# Patient Record
Sex: FEMALE | Race: WHITE | Employment: FULL TIME | URBAN - METROPOLITAN AREA
[De-identification: names, ages, dates, MRNs, and addresses within clinical notes are randomized per-mention and may not be internally consistent; named-entity substitution may affect disease eponyms.]

---

## 2018-09-26 ENCOUNTER — OFFICE VISIT (OUTPATIENT)
Dept: DERMATOLOGY | Age: 33
End: 2018-09-26
Payer: COMMERCIAL

## 2018-09-26 DIAGNOSIS — L70.9 ADULT ACNE: Primary | ICD-10-CM

## 2018-09-26 DIAGNOSIS — L72.0 EPIDERMOID CYST: ICD-10-CM

## 2018-09-26 PROCEDURE — 11900 INJECT SKIN LESIONS </W 7: CPT | Performed by: DERMATOLOGY

## 2018-09-26 PROCEDURE — 99212 OFFICE O/P EST SF 10 MIN: CPT | Performed by: DERMATOLOGY

## 2018-09-26 RX ORDER — CETIRIZINE HYDROCHLORIDE 10 MG/1
10 TABLET ORAL DAILY
COMMUNITY

## 2019-02-27 RX ORDER — SPIRONOLACTONE 50 MG/1
TABLET, FILM COATED ORAL
Qty: 30 TABLET | Refills: 3 | Status: SHIPPED | OUTPATIENT
Start: 2019-02-27 | End: 2019-07-11 | Stop reason: SDUPTHER

## 2019-03-07 ENCOUNTER — OFFICE VISIT (OUTPATIENT)
Dept: DERMATOLOGY | Age: 34
End: 2019-03-07
Payer: COMMERCIAL

## 2019-03-07 DIAGNOSIS — L70.9 ADULT ACNE: Primary | ICD-10-CM

## 2019-03-07 DIAGNOSIS — L72.0 EPIDERMOID CYST: ICD-10-CM

## 2019-03-07 PROCEDURE — 99213 OFFICE O/P EST LOW 20 MIN: CPT | Performed by: DERMATOLOGY

## 2019-05-07 ENCOUNTER — TELEPHONE (OUTPATIENT)
Dept: DERMATOLOGY | Age: 34
End: 2019-05-07

## 2019-05-07 NOTE — TELEPHONE ENCOUNTER
This is a patient of Dr. Noam Palacio. She was just seen on 3-19-19. She has an area on her nose which Dr. Noam Palacio has previously treated that has come back again and is inflamed. She would like an appointment for another injection. She can be reached at [phone].   Thanks

## 2019-05-08 ENCOUNTER — OFFICE VISIT (OUTPATIENT)
Dept: DERMATOLOGY | Age: 34
End: 2019-05-08
Payer: COMMERCIAL

## 2019-05-08 DIAGNOSIS — L72.3 INFLAMED EPIDERMOID CYST OF SKIN: Primary | ICD-10-CM

## 2019-05-08 PROCEDURE — 11900 INJECT SKIN LESIONS </W 7: CPT | Performed by: DERMATOLOGY

## 2019-05-08 NOTE — PROGRESS NOTES
[facility]  Treemerissa Autumn Rosas  1985    35 y.o. female     Date of Visit: 5/8/2019    Chief Complaint: cyst flared. History of Present Illness:    F/u for a cyst on the left nasal tip - flared up in the last few days with stress. Improved in past with intralesional Kenalog. Imaging in [place] showed a 0.2 cm hypoechoic lesion without any calcification. Review of Systems:  None. Past Medical History, Family History, Surgical History, Medications and Allergies reviewed. Past Medical History:   Diagnosis Date    Acne     Chicken pox      No past surgical history on file. Allergies   Allergen Reactions    Ciprofloxacin      Lip swelling     Outpatient Medications Marked as Taking for the 5/8/19 encounter (Office Visit) with Dmitri Mcgraw MD   Medication Sig Dispense Refill    spironolactone (ALDACTONE) 50 MG tablet TAKE 1 TABLET BY MOUTH ONCE DAILY 30 tablet 3    Drospirenone-Ethinyl Estradiol (JEREMY 28 PO) Take by mouth      cetirizine (ZYRTEC) 10 MG tablet Take 10 mg by mouth daily      diphenhydrAMINE (BENADRYL) 25 MG capsule Take 25 mg by mouth every 6 hours as needed for Itching.  Cholecalciferol (VITAMIN D3) 2000 UNITS CAPS Take 1 capsule by mouth daily. 30 capsule 11         Physical Examination       Well appearing. 1.  Left nasal tip - erythematous papule. Assessment and Plan     1. Inflamed epidermoid cyst of skin     Intralesional Kenalog 2.5 mg/mL - 0.05 mL injected into the cyst on the right left nasal tip.

## 2019-05-28 ENCOUNTER — PATIENT MESSAGE (OUTPATIENT)
Dept: DERMATOLOGY | Age: 34
End: 2019-05-28

## 2019-05-30 ENCOUNTER — OFFICE VISIT (OUTPATIENT)
Dept: DERMATOLOGY | Age: 34
End: 2019-05-30
Payer: COMMERCIAL

## 2019-05-30 DIAGNOSIS — L72.0 EPIDERMOID CYST: Primary | ICD-10-CM

## 2019-05-30 PROCEDURE — 11900 INJECT SKIN LESIONS </W 7: CPT | Performed by: DERMATOLOGY

## 2019-05-30 NOTE — PROGRESS NOTES
[facility]  Autumn Foy  1985    35 y.o. female     Date of Visit: 5/30/2019    Chief Complaint: cyst     History of Present Illness:    F/u for a cyst on the nasal tip - better but not gone with intralesional Kenalog 3 weeks ago. Review of Systems:  None. Past Medical History, Family History, Surgical History, Medications and Allergies reviewed. Past Medical History:   Diagnosis Date    Acne     Chicken pox      History reviewed. No pertinent surgical history. Allergies   Allergen Reactions    Ciprofloxacin      Lip swelling     Outpatient Medications Marked as Taking for the 5/30/19 encounter (Office Visit) with Allen Adams MD   Medication Sig Dispense Refill    spironolactone (ALDACTONE) 50 MG tablet TAKE 1 TABLET BY MOUTH ONCE DAILY 30 tablet 3    Drospirenone-Ethinyl Estradiol (JEREMY 28 PO) Take by mouth      cetirizine (ZYRTEC) 10 MG tablet Take 10 mg by mouth daily      diphenhydrAMINE (BENADRYL) 25 MG capsule Take 25 mg by mouth every 6 hours as needed for Itching.  Cholecalciferol (VITAMIN D3) 2000 UNITS CAPS Take 1 capsule by mouth daily. 30 capsule 11         Physical Examination       Well appearing. 1.  Left nasal tip - small dermal whitish papule. Assessment and Plan     1.  Epidermoid cyst of the nose -     Intralesional Kenalog 2.5 mg/mL - less than 0.1 mL injected into the cyst.

## 2020-03-09 ENCOUNTER — OFFICE VISIT (OUTPATIENT)
Dept: DERMATOLOGY | Age: 35
End: 2020-03-09
Payer: COMMERCIAL

## 2020-03-09 PROCEDURE — 99213 OFFICE O/P EST LOW 20 MIN: CPT | Performed by: DERMATOLOGY

## 2020-03-09 RX ORDER — DOXYCYCLINE HYCLATE 100 MG
TABLET ORAL
COMMUNITY
Start: 2020-03-07 | End: 2021-09-14

## 2020-07-11 ENCOUNTER — PATIENT MESSAGE (OUTPATIENT)
Dept: DERMATOLOGY | Age: 35
End: 2020-07-11

## 2020-07-13 NOTE — TELEPHONE ENCOUNTER
From: Angel Higgins  To: Bubba Pendleton MD  Sent: 7/11/2020 3:57 PM EDT  Subject: Non-Urgent Medical Question    Hi Dr. Mabel Curtis (and staff),    Right before the Covid-19 lock-down I came in for a severe allergic reaction on my upper lip. The scabbing and swelling went away with the topical ointment and steroids prescribed, but I've got redness still. The redness in the same place as the reaction, it's occasional - sometimes it will be there, sometimes it won't, and sometimes it the entire affected area, other times it's just part. It's been months now, what should I do? Do I need to come in/have an e-visit? Let me know, thanks.     Donnell Jimenez

## 2020-08-04 NOTE — TELEPHONE ENCOUNTER
Offered patient appt for tomorrow @ 11am but patient declined. Will call her back with any cancellations.

## 2020-08-11 NOTE — TELEPHONE ENCOUNTER
Called and left message for patient to call back office. Please offer her an appt on 8/18/20 if still available.

## 2020-08-12 ENCOUNTER — OFFICE VISIT (OUTPATIENT)
Dept: DERMATOLOGY | Age: 35
End: 2020-08-12
Payer: COMMERCIAL

## 2020-08-12 VITALS — TEMPERATURE: 97.9 F

## 2020-08-12 PROCEDURE — 99212 OFFICE O/P EST SF 10 MIN: CPT | Performed by: DERMATOLOGY

## 2020-08-12 NOTE — PROGRESS NOTES
[facility]  Scotty Giron MD  [phone]      Bisi Deleon  1985    28 y.o. female     Date of Visit: 8/12/2020    Chief Complaint: redness    History of Present Illness:    1. She has a history of allergic contact dermatitis of the upper lip due to mupirocin nearly 6 months ago. Her dermatitis cleared but she continues to suffer from prominent erythema on the upper lip that waxes and wanes in severity. Review of Systems:  None. Past Medical History, Family History, Surgical History, Medications and Allergies reviewed. Past Medical History:   Diagnosis Date    Acne     Chicken pox      History reviewed. No pertinent surgical history. Allergies   Allergen Reactions    Ciprofloxacin      Lip swelling    Seasonal Other (See Comments)     sneezing     Outpatient Medications Marked as Taking for the 8/12/20 encounter (Office Visit) with Ana Gandhi MD   Medication Sig Dispense Refill    spironolactone (ALDACTONE) 50 MG tablet Take 1 tablet by mouth daily 30 tablet 5    doxycycline hyclate (VIBRA-TABS) 100 MG tablet       triamcinolone (KENALOG) 0.1 % ointment Apply to affected area twice daily for up to 2 weeks or until improved. 30 g 0    Drospirenone-Ethinyl Estradiol (JEREMY 28 PO) Take by mouth      cetirizine (ZYRTEC) 10 MG tablet Take 10 mg by mouth daily      diphenhydrAMINE (BENADRYL) 25 MG capsule Take 25 mg by mouth every 6 hours as needed for Itching.  Cholecalciferol (VITAMIN D3) 2000 UNITS CAPS Take 1 capsule by mouth daily. 30 capsule 11       Physical Examination     Well appearing. 1.  Upper lip with mild to moderate erythema. Blanches with pressure. Assessment and Plan     1. Postinflammatory erythema    Vbeam with Dr. Galen Victor.

## 2020-08-12 NOTE — Clinical Note
Antonina Rojas has post inflammatory erythema of the upper lip that has been present since she suffered from allergic contact dermatitis 6 mos ago.   Is this someone you could treat with Vbeam?      Thanks,    Hubert Alonzo

## 2020-08-14 NOTE — PROGRESS NOTES
It's a bit hard to predict if it'll respond and clear completely but it's definitely worth trying. Send it back to Yovany Espinoza to schedule for Vbeam if she wants to see me.

## 2020-08-31 ENCOUNTER — PROCEDURE VISIT (OUTPATIENT)
Dept: DERMATOLOGY | Age: 35
End: 2020-08-31

## 2020-08-31 VITALS — TEMPERATURE: 97.7 F

## 2020-08-31 PROCEDURE — MISCHAIR23 LASER TEST SPOT: Performed by: DERMATOLOGY

## 2020-08-31 PROCEDURE — DM01300 VBEAM LASER EXTRA SMALL AREA: Performed by: DERMATOLOGY

## 2020-08-31 NOTE — PATIENT INSTRUCTIONS
Following the procedure, the treated areas may be red or appear bruised and will likely be swollen for a few hours or up to a few days. The affected areas should be treated delicately following treatment. Discomfort and swelling should be treated with the application of hourly cool compresses the evening of the procedure. Avoid applying ice directly to the skin. If your face was treated, you may want to sleep with your head elevated on an extra pillow to help minimize swelling. Wear sunscreen daily and avoid strenuous activity following rosacea treatments to optimize results. Avoid extra aspirin, ibuprofen, vitamin E following the procedure - this may increase your chance of bruising. Improper care of the treated area while the discoloration is present may increase the chance of scarring or skin textural changes to the treated area. Please call the office if you have excessive discomfort, herpes simplex virus flare, or burns, blisters, or scabbing.

## 2020-08-31 NOTE — PROGRESS NOTES
Laser Procedure Note       Messi Arreola   YOB: 1985    DATE OF VISIT:  2020  Chief Complaint   Patient presents with    Laser Treatment   * - WEBN    LASER: Vbeam  DIAGNOSIS: erythema/telangiectasias    Here for treatment of erythema/telangiectasias on the upper lip/medial cheeks. Occurred after contact dermatitis from bactroban ~ 6 mos ago. No previous laser. We discussed treatment options, including no treatment as well as the following:  - need for multiple treatments and risk of incomplete clearance, recurrence  - risk of erythema, edema, purpura, dyspigmentation and scarring    PATIENT IDENTIFIED PER PROTOCOL: yes  LOCATION(S): face  VERIFIED AND MARKED: yes  TECHNIQUES, RISKS, BENEFITS AND ALTERNATIVES EXPLAINED: yes  CONSENT SIGNED, WITNESSED AND DATED: yes      OPERATIVE REPORT    DIAGNOSIS, LOCATION, PROCEDURE RECONFIRMED: yes   APPROPRIATE EYE PROTECTION for PATIENT: yes  APPROPRIATE EYE PROTECTION for PROVIDER and OTHERS PRESENT: yes  ANESTHESIA/PRE-OP MEDICATIONS: none  LASER SETTINGS:  (1)  WAVELENGTH: 595  LENS: 3x10  FLUENCE: 12.5  PULSE DURATION: 10  COOLIN/20  (2)  WAVELENGTH: 595  LENS: 10  FLUENCE: 7.5  PULSE DURATION: 10  COOLIN/20    PROCEDURE NOTE:  Individual telangiectasias treated with setting 1 - one - two pulses/  Then upper lip, medial cheeks and nose treated with setting 2. POST-OPERATIVE CARE/DISPOSITION: ice pack prn  COMPLICATIONS: none  MEDICATIONS: none  WOUND CARE INSTRUCTIONS PROVIDED: yes    F/u 1 month.         [address] [facility]

## 2020-09-14 ENCOUNTER — OFFICE VISIT (OUTPATIENT)
Dept: DERMATOLOGY | Age: 35
End: 2020-09-14
Payer: COMMERCIAL

## 2020-09-14 VITALS — TEMPERATURE: 97.2 F

## 2020-09-14 PROCEDURE — 99213 OFFICE O/P EST LOW 20 MIN: CPT | Performed by: DERMATOLOGY

## 2020-09-15 RX ORDER — SPIRONOLACTONE 50 MG/1
50 TABLET, FILM COATED ORAL DAILY
Qty: 30 TABLET | Refills: 5 | Status: SHIPPED | OUTPATIENT
Start: 2020-09-15 | End: 2021-03-16 | Stop reason: SDUPTHER

## 2020-10-09 ENCOUNTER — TELEPHONE (OUTPATIENT)
Dept: DERMATOLOGY | Age: 35
End: 2020-10-09

## 2020-11-18 ENCOUNTER — TELEPHONE (OUTPATIENT)
Dept: DERMATOLOGY | Age: 35
End: 2020-11-18

## 2020-11-18 NOTE — TELEPHONE ENCOUNTER
LVM for patient to return call.
Pt calling not feeling well need to r/s appt for v-beam with  pls return call back @ [phone] to discuss
Cardiac

## 2021-03-16 RX ORDER — SPIRONOLACTONE 50 MG/1
50 TABLET, FILM COATED ORAL DAILY
Qty: 30 TABLET | Refills: 5 | Status: SHIPPED | OUTPATIENT
Start: 2021-03-16 | End: 2021-09-07

## 2021-09-14 ENCOUNTER — TELEPHONE (OUTPATIENT)
Dept: DERMATOLOGY | Age: 36
End: 2021-09-14

## 2021-09-14 ENCOUNTER — OFFICE VISIT (OUTPATIENT)
Dept: DERMATOLOGY | Age: 36
End: 2021-09-14
Payer: COMMERCIAL

## 2021-09-14 VITALS — TEMPERATURE: 99 F

## 2021-09-14 DIAGNOSIS — L70.9 ADULT ACNE: Primary | ICD-10-CM

## 2021-09-14 DIAGNOSIS — L53.9 ERYTHEMA: ICD-10-CM

## 2021-09-14 DIAGNOSIS — L71.9 ROSACEA: ICD-10-CM

## 2021-09-14 PROCEDURE — 99213 OFFICE O/P EST LOW 20 MIN: CPT | Performed by: DERMATOLOGY

## 2021-09-14 RX ORDER — METRONIDAZOLE 7.5 MG/G
GEL TOPICAL
Qty: 45 G | Refills: 2 | Status: SHIPPED | OUTPATIENT
Start: 2021-09-14 | End: 2022-09-15

## 2021-09-14 NOTE — PROGRESS NOTES
[facility]  Lacey Soto MD  [phone]      Hussain Fu  1985    39 y.o. female     Date of Visit: 9/14/2021    Chief Complaint: acne, post inflammatory erythema    History of Present Illness:    1. She returns today to follow-up for adult female acne-reports great control with Caro and spironolactone 50 mg daily. She reports no adverse effects of treatment. 2.  She also complains of new onset erythematous papular eruption on the central face. 3.  She also has a history of postinflammatory erythema secondary to allergic contact dermatitis that she experienced in 2020. She had treatment with laser with initial improvement. Review of Systems:  Gen: Feels well, good sense of health. Past Medical History, Family History, Surgical History, Medications and Allergies reviewed. Past Medical History:   Diagnosis Date    Acne     Chicken pox      History reviewed. No pertinent surgical history. Allergies   Allergen Reactions    Ciprofloxacin      Lip swelling    Neosporin [Bacitracin-Polymyxin B]     Seasonal Other (See Comments)     sneezing    Bactroban [Mupirocin Calcium] Rash     Outpatient Medications Marked as Taking for the 9/14/21 encounter (Office Visit) with Jackee Gosselin, MD   Medication Sig Dispense Refill    Acetaminophen-Codeine (TYLENOL WITH CODEINE #3 PO) Take by mouth as needed (Migraines)      metroNIDAZOLE (METROGEL) 0.75 % gel Apply to the face 2 times daily for rosacea. 45 g 2    spironolactone (ALDACTONE) 50 MG tablet TAKE 1 TABLET BY MOUTH DAILY 30 tablet 3    Drospirenone-Ethinyl Estradiol (JEREMY 28 PO) Take by mouth      cetirizine (ZYRTEC) 10 MG tablet Take 10 mg by mouth daily      diphenhydrAMINE (BENADRYL) 25 MG capsule Take 25 mg by mouth every 6 hours as needed for Itching.  Cholecalciferol (VITAMIN D3) 2000 UNITS CAPS Take 1 capsule by mouth daily.  30 capsule 11         Physical Examination     Well appearing. 1.  Clear. 2.  Left medial cheek/nose, chin with several small erythematous papules and a background of erythema. 3.  Upper lip with mild erythema. Assessment and Plan     1. Adult acne - remains under great control    Continue Caro and spironolactone 50 mg daily. 2. Rosacea - mild papulopustular, new diagnosis     Start metronidazole 0.75% gel twice daily. 3. Erythema, postinflammatory-improved but not yet clear    Vbeam with Dr. Allan Reed. Return in about 1 year (around 9/14/2022).     --Dolly Deng MD

## 2021-10-08 ENCOUNTER — TELEPHONE (OUTPATIENT)
Dept: DERMATOLOGY | Age: 36
End: 2021-10-08

## 2021-10-08 NOTE — TELEPHONE ENCOUNTER
Dr Yina Thorpe patient  Pt c/b #[phone]  Pt stated  Needs to cancel/reschedule her Vbeam procedure appt that is scheduled on 10/12 due to a work conflict.

## 2021-11-11 ENCOUNTER — PROCEDURE VISIT (OUTPATIENT)
Dept: DERMATOLOGY | Age: 36
End: 2021-11-11

## 2021-11-11 VITALS — TEMPERATURE: 97.2 F

## 2021-11-11 DIAGNOSIS — L71.9 ROSACEA: Primary | ICD-10-CM

## 2021-11-11 DIAGNOSIS — L53.9 ERYTHEMA: ICD-10-CM

## 2021-11-11 PROCEDURE — DM01310 VBEAM LASER SMALL OR 2 AREAS: Performed by: DERMATOLOGY

## 2021-11-11 NOTE — PROGRESS NOTES
Laser Procedure Note       Prashant Brennan   YOB: 1985    DATE OF VISIT:  2021  No chief complaint on file. * - WEBN    LASER: Vbeam - last treated in 2020 x 1  DIAGNOSIS: erythema/telangiectasias    Here for treatment of erythema/telangiectasias on the upper lip/medial cheeks. Occurred after contact dermatitis from bactroban ~ 6 mos ago. 1 previous laser with some improvement. We discussed treatment options, including no treatment as well as the following:  - need for multiple treatments and risk of incomplete clearance, recurrence  - risk of erythema, edema, purpura, dyspigmentation and scarring    PATIENT IDENTIFIED PER PROTOCOL: yes  LOCATION(S): face  VERIFIED AND MARKED: yes  TECHNIQUES, RISKS, BENEFITS AND ALTERNATIVES EXPLAINED: yes  CONSENT SIGNED, WITNESSED AND DATED: yes      OPERATIVE REPORT    DIAGNOSIS, LOCATION, PROCEDURE RECONFIRMED: yes   APPROPRIATE EYE PROTECTION for PATIENT: yes  APPROPRIATE EYE PROTECTION for PROVIDER and OTHERS PRESENT: yes  ANESTHESIA/PRE-OP MEDICATIONS: none  LASER SETTINGS:  (1)  WAVELENGTH: 595  LENS: 3x10  FLUENCE: 12.5  PULSE DURATION: 10  COOLIN/20  (2)  WAVELENGTH: 595  LENS: 10  FLUENCE: 7.5  PULSE DURATION: 10  COOLIN/20    PROCEDURE NOTE:  Individual telangiectasias treated with setting 1 - one - two pulses/  Then upper lip, chin, cheeks and nose treated with setting 2. POST-OPERATIVE CARE/DISPOSITION: ice pack prn  COMPLICATIONS: none  MEDICATIONS: can try rhofade daily   WOUND CARE INSTRUCTIONS PROVIDED: yes    F/u 1 month.         200 (decrease back to 150 at f/u - larger area today that needed trx)

## 2022-01-07 ENCOUNTER — TELEPHONE (OUTPATIENT)
Dept: DERMATOLOGY | Age: 37
End: 2022-01-07

## 2022-02-01 ENCOUNTER — PROCEDURE VISIT (OUTPATIENT)
Dept: DERMATOLOGY | Age: 37
End: 2022-02-01

## 2022-02-01 VITALS — TEMPERATURE: 97 F

## 2022-02-01 DIAGNOSIS — L71.9 ROSACEA: ICD-10-CM

## 2022-02-01 DIAGNOSIS — L53.9 ERYTHEMA: Primary | ICD-10-CM

## 2022-02-01 PROCEDURE — DM01505 PIGMENTED LASER 2-5 LESIONS: Performed by: DERMATOLOGY

## 2022-02-01 NOTE — PROGRESS NOTES
Laser Procedure Note       Ralph Villar   YOB: 1985    DATE OF VISIT:  2022  Chief Complaint   Patient presents with    Procedure     vbeam major swelling frm last procedure has pics    * - WEBN    LASER: Vbeam - last treated in 2020 x 1 and   DIAGNOSIS: erythema/telangiectasias    Here for treatment of erythema/telangiectasias on the upper lip/medial cheeks. Occurred after contact dermatitis from [facility] . 2 previous laser with improvement but now feels more erythema on the cheeks. Had a lot more swelling after last trx. Objectively improved in folders. Baseline:      :        Baseline: We discussed treatment options, including no treatment as well as the following:  - need for multiple treatments and risk of incomplete clearance, recurrence  - risk of erythema, edema, purpura, dyspigmentation and scarring    PATIENT IDENTIFIED PER PROTOCOL: yes  LOCATION(S): face  VERIFIED AND MARKED: yes  TECHNIQUES, RISKS, BENEFITS AND ALTERNATIVES EXPLAINED: yes  CONSENT SIGNED, WITNESSED AND DATED: yes      OPERATIVE REPORT    DIAGNOSIS, LOCATION, PROCEDURE RECONFIRMED: yes   APPROPRIATE EYE PROTECTION for PATIENT: yes  APPROPRIATE EYE PROTECTION for PROVIDER and OTHERS PRESENT: yes  ANESTHESIA/PRE-OP MEDICATIONS: none  LASER SETTINGS:  (1)  WAVELENGTH: 595  LENS: 3x10  FLUENCE: 12.5  PULSE DURATION: 10  COOLIN/20  (2)  WAVELENGTH: 595  LENS: 10  FLUENCE: 7.5  PULSE DURATION: 10  COOLIN/20    PROCEDURE NOTE:  Individual telangiectasias treated with setting 1 - one - two pulses/  Then upper lip, ala/focal cheeks and nose treated with setting 2. POST-OPERATIVE CARE/DISPOSITION: ice pack prn  COMPLICATIONS: none  MEDICATIONS: can try rhofade daily   WOUND CARE INSTRUCTIONS PROVIDED: yes    F/u 1 month.     150 (decreased back to 150 - larger at last visit - 200)

## 2022-04-04 ENCOUNTER — PROCEDURE VISIT (OUTPATIENT)
Dept: DERMATOLOGY | Age: 37
End: 2022-04-04

## 2022-04-04 VITALS — TEMPERATURE: 98.2 F

## 2022-04-04 DIAGNOSIS — L53.9 ERYTHEMA: Primary | ICD-10-CM

## 2022-04-04 DIAGNOSIS — L71.9 ROSACEA: ICD-10-CM

## 2022-04-04 PROCEDURE — DM01310 VBEAM LASER SMALL OR 2 AREAS: Performed by: DERMATOLOGY

## 2022-04-04 RX ORDER — CLOBETASOL PROPIONATE 0.5 MG/G
OINTMENT TOPICAL
Qty: 30 G | Refills: 0 | Status: SHIPPED | OUTPATIENT
Start: 2022-04-04

## 2022-04-04 NOTE — PATIENT INSTRUCTIONS
Following the procedure, the treated areas may be red or appear bruised and will likely be swollen for a few hours or up to a few days. The affected areas should be treated delicately following treatment. Discomfort and swelling should be treated with the application of hourly cool compresses the evening of the procedure. Avoid applying ice directly to the skin. If your face was treated, you may want to sleep with your head elevated on an extra pillow to help minimize swelling. Wear sunscreen daily and avoid strenuous activity following rosacea treatments to optimize results. Avoid extra aspirin, ibuprofen, vitamin E following the procedure - this may increase your chance of bruising. Improper care of the treated area while the discoloration is present may increase the chance of scarring or skin textural changes to the treated area.     Please call the office if you have excessive discomfort, herpes simplex virus flare, or burns, blisters, or scabbing.    200.00

## 2022-04-04 NOTE — PROGRESS NOTES
incomplete clearance, recurrence  - risk of erythema, edema, purpura, dyspigmentation and scarring    PATIENT IDENTIFIED PER PROTOCOL: yes  LOCATION(S): face  VERIFIED AND MARKED: yes  TECHNIQUES, RISKS, BENEFITS AND ALTERNATIVES EXPLAINED: yes  CONSENT SIGNED, WITNESSED AND DATED: yes      OPERATIVE REPORT    DIAGNOSIS, LOCATION, PROCEDURE RECONFIRMED: yes   APPROPRIATE EYE PROTECTION for PATIENT: yes  APPROPRIATE EYE PROTECTION for PROVIDER and OTHERS PRESENT: yes  ANESTHESIA/PRE-OP MEDICATIONS: none  LASER SETTINGS:  (1)  WAVELENGTH: 595  LENS: 3x10  FLUENCE: 12.5  PULSE DURATION: 10  COOLIN/20  (2)  WAVELENGTH: 595  LENS: 10  FLUENCE: 7.5  PULSE DURATION: 10  COOLIN/20    PROCEDURE NOTE:  Individual telangiectasias treated with setting 1 - one - two pulses/  Then ala, cheeks and nose treated with setting 2. POST-OPERATIVE CARE/DISPOSITION: ice pack prn  COMPLICATIONS: none  MEDICATIONS: can try rhofade daily   WOUND CARE INSTRUCTIONS PROVIDED: yes    F/u 1 month.     200 (decreased back to 150 - larger at last visit - 200)    *would like to add bleaching cream for some mild pigmentation c/w mild melasma/photodamage on the cheeks - skin medicinals -   Azelaic Acid: 20%  Kojic Acid: 6%  Niacinamide: 4%         *ears - itchy and broken out recently (hx of seasonal allergies); no new products - can use clobetasol bid prn flares and rec dbl dose of daily antihistamine for the next couple of weeks until improved

## 2022-07-05 ENCOUNTER — PROCEDURE VISIT (OUTPATIENT)
Dept: DERMATOLOGY | Age: 37
End: 2022-07-05

## 2022-07-05 DIAGNOSIS — L53.9 ERYTHEMA: Primary | ICD-10-CM

## 2022-07-05 PROCEDURE — DM01505 PIGMENTED LASER 2-5 LESIONS: Performed by: DERMATOLOGY

## 2022-07-05 NOTE — PROGRESS NOTES
Laser Procedure Note       Linda Walker   YOB: 1985    DATE OF VISIT:  7/5/2022  Chief Complaint   Patient presents with    Laser Treatment   * - WEBN    LASER: Vbeam - last treated in 2020 x 1 and  and 2-2022 and 4-2022  DIAGNOSIS: erythema/telangiectasias    Here for treatment of erythema/telangiectasias on the upper lip/medial cheeks. Occurred after contact dermatitis from [facility] in 2020. Initially trx perioral area and more recently more diffusely on the cheeks and nose with significant improvement. Had a lot more swelling after  trx. Significantly improved by photos. Baseline:      2-2022:        Baseline:          Past Medical History:   Diagnosis Date    Acne     Chicken pox        History reviewed. No pertinent surgical history. Outpatient Medications Marked as Taking for the 7/5/22 encounter (Procedure visit) with Michelle Vargas MD   Medication Sig Dispense Refill    clobetasol (TEMOVATE) 0.05 % ointment Apply to affected area BID PRN flares 30 g 0    spironolactone (ALDACTONE) 50 MG tablet Take 1 tablet by mouth daily 30 tablet 5    Acetaminophen-Codeine (TYLENOL WITH CODEINE #3 PO) Take by mouth as needed (Migraines)      Drospirenone-Ethinyl Estradiol (JEREMY 28 PO) Take by mouth      cetirizine (ZYRTEC) 10 MG tablet Take 10 mg by mouth daily      diphenhydrAMINE (BENADRYL) 25 MG capsule Take 25 mg by mouth every 6 hours as needed for Itching.  Cholecalciferol (VITAMIN D3) 2000 UNITS CAPS Take 1 capsule by mouth daily.  30 capsule 11       Allergies   Allergen Reactions    Ciprofloxacin      Lip swelling    Neosporin [Bacitracin-Polymyxin B]     Seasonal Other (See Comments)     sneezing    Bactroban [Mupirocin Calcium] Rash         We discussed treatment options, including no treatment as well as the following:  - need for multiple treatments and risk of incomplete clearance, recurrence  - risk of erythema, edema, purpura, dyspigmentation and scarring    PATIENT IDENTIFIED PER PROTOCOL: yes  LOCATION(S): face  VERIFIED AND MARKED: yes  TECHNIQUES, RISKS, BENEFITS AND ALTERNATIVES EXPLAINED: yes  CONSENT SIGNED, WITNESSED AND DATED: yes      OPERATIVE REPORT    DIAGNOSIS, LOCATION, PROCEDURE RECONFIRMED: yes   APPROPRIATE EYE PROTECTION for PATIENT: yes  APPROPRIATE EYE PROTECTION for PROVIDER and OTHERS PRESENT: yes  ANESTHESIA/PRE-OP MEDICATIONS: none  LASER SETTINGS:  (1)  WAVELENGTH: 595  LENS: 3x10  FLUENCE: 12.5  PULSE DURATION: 10  COOLIN/20  (2)  WAVELENGTH: 595  LENS: 10  FLUENCE: 7.5  PULSE DURATION: 10  COOLIN/20    PROCEDURE NOTE:  Individual telangiectasias treated with setting 1 - one - two pulses/  Then ala, cheeks and nose treated with setting 2. POST-OPERATIVE CARE/DISPOSITION: ice pack prn  COMPLICATIONS: none  MEDICATIONS: can try rhofade daily   WOUND CARE INSTRUCTIONS PROVIDED: yes    F/u 1 month.     150 (decreased back to 150 - larger at last visit - 200)    *consider adding bleaching cream for some mild pigmentation c/w mild melasma/photodamage on the cheeks - didn't feel like she needed it yet after last visit since improved - skin medicinals -   Azelaic Acid: 20%  Kojic Acid: 6%  Niacinamide: 4%

## 2022-09-15 ENCOUNTER — OFFICE VISIT (OUTPATIENT)
Dept: DERMATOLOGY | Age: 37
End: 2022-09-15
Payer: COMMERCIAL

## 2022-09-15 DIAGNOSIS — L70.9 ADULT ACNE: Primary | ICD-10-CM

## 2022-09-15 PROCEDURE — 99213 OFFICE O/P EST LOW 20 MIN: CPT | Performed by: DERMATOLOGY

## 2022-09-15 RX ORDER — SPIRONOLACTONE 50 MG/1
50 TABLET, FILM COATED ORAL DAILY
Qty: 90 TABLET | Refills: 4 | Status: SHIPPED | OUTPATIENT
Start: 2022-09-15 | End: 2022-10-31

## 2022-09-15 NOTE — PROGRESS NOTES
[facility]  Isabel Malik MD  [phone]      Lali Saunders  1985    40 y.o. female     Date of Visit: 9/15/2022    Chief Complaint: acne    History of Present Illness:    1. She returns today to follow-up for adult female acne. She remains on Caro and spironolactone 50 mg daily with overall great control of her acne. She rarely experiences any recurrences or flares. Serum potassium within normal limits on 12/8/2021. Review of Systems:  Gen: Feels well, good sense of health. Past Medical History, Family History, Surgical History, Medications and Allergies reviewed. Past Medical History:   Diagnosis Date    Acne     Chicken pox      History reviewed. No pertinent surgical history. Allergies   Allergen Reactions    Ciprofloxacin      Lip swelling    Neosporin [Bacitracin-Polymyxin B]     Seasonal Other (See Comments)     sneezing    Bactroban [Mupirocin Calcium] Rash     Outpatient Medications Marked as Taking for the 9/15/22 encounter (Office Visit) with Supriya Fitzpatrick MD   Medication Sig Dispense Refill    spironolactone (ALDACTONE) 50 MG tablet Take 1 tablet by mouth daily 90 tablet 4    tretinoin (RETIN-A) 0.025 % cream Apply pea-sized amount to entire face at bedtime. 20 g 3    clobetasol (TEMOVATE) 0.05 % ointment Apply to affected area BID PRN flares 30 g 0    Acetaminophen-Codeine (TYLENOL WITH CODEINE #3 PO) Take by mouth as needed (Migraines)      Drospirenone-Ethinyl Estradiol (JEREMY 28 PO) Take by mouth      cetirizine (ZYRTEC) 10 MG tablet Take 10 mg by mouth daily      diphenhydrAMINE (BENADRYL) 25 MG capsule Take 25 mg by mouth every 6 hours as needed for Itching. Cholecalciferol (VITAMIN D3) 2000 UNITS CAPS Take 1 capsule by mouth daily. 30 capsule 11         Physical Examination       The following were examined and determined to be normal: Psych/Neuro, Scalp/hair, Conjunctivae/eyelids, Gums/teeth/lips, and Neck.     The following were examined and determined to be abnormal: Head/face. Well appearing. 1.  Clear! Assessment and Plan     1. Adult acne - clear/under good control! Continue spironolactone 50 mg daily. Refills provided. Add tretinoin 0.025% cream nightly. Discussed risk of dryness and photosensitivity. Return in about 1 year (around 9/15/2023).     --Mine Winters MD

## 2022-09-16 RX ORDER — SPIRONOLACTONE 50 MG/1
50 TABLET, FILM COATED ORAL DAILY
Qty: 30 TABLET | OUTPATIENT
Start: 2022-09-16

## 2022-10-31 RX ORDER — SPIRONOLACTONE 50 MG/1
50 TABLET, FILM COATED ORAL DAILY
Qty: 90 TABLET | Refills: 3 | Status: SHIPPED | OUTPATIENT
Start: 2022-10-31

## 2023-01-20 ENCOUNTER — PATIENT MESSAGE (OUTPATIENT)
Dept: DERMATOLOGY | Age: 38
End: 2023-01-20

## 2023-01-20 RX ORDER — TRETINOIN 0.5 MG/G
CREAM TOPICAL
Qty: 20 G | Refills: 3 | Status: CANCELLED | OUTPATIENT
Start: 2023-01-20 | End: 2023-02-19

## 2023-01-20 RX ORDER — TRETINOIN 0.5 MG/G
CREAM TOPICAL
Qty: 20 G | Refills: 2 | Status: SHIPPED | OUTPATIENT
Start: 2023-01-20

## 2023-01-20 NOTE — TELEPHONE ENCOUNTER
From: Jessica Nicholson  To: Dr. Dolly Deng  Sent: 1/20/2023 12:15 PM EST  Subject: Felton Cook Dr. Miko Blanc,    [address]! Hope you're well. Can I bump my Tretinoin cream from 0.025%? I use it every night and haven't had any reactions or purging over the last few months. As for the cyst, it's still gone, still feels a little weird, and the skin where it was is lighter than the skin around it. It's not super noticeable though.

## 2023-09-21 ENCOUNTER — OFFICE VISIT (OUTPATIENT)
Dept: DERMATOLOGY | Age: 38
End: 2023-09-21

## 2023-09-21 DIAGNOSIS — L70.9 ADULT ACNE: Primary | ICD-10-CM

## 2023-09-21 DIAGNOSIS — Z79.899 ENCOUNTER FOR LONG-TERM (CURRENT) USE OF MEDICATIONS: ICD-10-CM

## 2023-09-21 PROCEDURE — 99213 OFFICE O/P EST LOW 20 MIN: CPT | Performed by: DERMATOLOGY

## 2023-09-21 RX ORDER — TRETINOIN 1 MG/G
CREAM TOPICAL
Qty: 20 G | Refills: 4 | Status: SHIPPED | OUTPATIENT
Start: 2023-09-21

## 2023-09-21 NOTE — PROGRESS NOTES
[facility]  Yumiko Montanez MD  [phone]      Brenda Carrasco  1985    45 y.o. female     Date of Visit: 9/21/2023    Chief Complaint: acne    History of Present Illness:    She returns today for adult female acne. She reports excellent control of her acne with both Caro and spironolactone 50 mg daily. She has also been using tretinoin 0.05% cream nightly. She reports only 1 minor flare in the last several months. Review of Systems:  Gen: Feels well, good sense of health. Past Medical History, Family History, Surgical History, Medications and Allergies reviewed. Past Medical History:   Diagnosis Date    Acne     Chicken pox      History reviewed. No pertinent surgical history. Allergies   Allergen Reactions    Ciprofloxacin      Lip swelling    Neosporin [Bacitracin-Polymyxin B]     Seasonal Other (See Comments)     sneezing    Bactroban [Mupirocin Calcium] Rash     Outpatient Medications Marked as Taking for the 9/21/23 encounter (Office Visit) with Jordan Allison MD   Medication Sig Dispense Refill    tretinoin (RETIN-A) 0.1 % cream Apply a pea sized amount to the face at bedtime. 20 g 4    spironolactone (ALDACTONE) 50 MG tablet TAKE 1 TABLET BY MOUTH DAILY 90 tablet 3    clobetasol (TEMOVATE) 0.05 % ointment Apply to affected area BID PRN flares 30 g 0    Acetaminophen-Codeine (TYLENOL WITH CODEINE #3 PO) Take by mouth as needed (Migraines)      Drospirenone-Ethinyl Estradiol (JEREMY 28 PO) Take by mouth      cetirizine (ZYRTEC) 10 MG tablet Take 1 tablet by mouth daily      Cholecalciferol (VITAMIN D3) 2000 UNITS CAPS Take 1 capsule by mouth daily. 30 capsule 11         Physical Examination       Well appearing. 1.  Face clear. Assessment and Plan     1. Adult female acne - under great control    Continue tretinoin but increase strength to 0.1% cream nightly. Continue spironolactone 50 mg daily. Check renal profile. Continue Caro.

## 2024-05-10 ENCOUNTER — PATIENT MESSAGE (OUTPATIENT)
Dept: DERMATOLOGY | Age: 39
End: 2024-05-10

## 2024-05-13 NOTE — TELEPHONE ENCOUNTER
From: Kya Velazco  To: Dr. Kate Lyons  Sent: 5/10/2024 9:28 AM EDT  Subject: Redness Returning    Hi Dr. Lyons!    I've got some new broken capillaries, same area as before, so upper lip and around my nose. You said to schedule another laser treatment with you if the redness returns. I think your nurse typically reaches out to schedule those appointments, I'd love to get on your calendar. My number is [phone] -Thank you!    Kya

## 2024-07-15 ENCOUNTER — PROCEDURE VISIT (OUTPATIENT)
Dept: DERMATOLOGY | Age: 39
End: 2024-07-15

## 2024-07-15 DIAGNOSIS — I78.1 TELANGIECTASIA: Primary | ICD-10-CM

## 2024-07-15 NOTE — PROGRESS NOTES
Laser Procedure Note       Kya Velazco   YOB: 1985    DATE OF VISIT:  7/15/2024  Chief Complaint   Patient presents with    Laser Treatment     Facial areas   * - WEBN    LASER: Vbeam - last treated in 2020 x 1 and  and 2-2022 and 4-2022  DIAGNOSIS: erythema/telangiectasias    Here for treatment of erythema/telangiectasias on the ala/cheeks.    Had significant improvement with previous trx of upper lip erythema and telang that occurred after contact dermatitis from bactroban in 2020.      Initially trx perioral area and more recently more diffusely on the cheeks and nose with significant improvement.    Had a lot more swelling after  trx.  Significantly improved by photos.    Baseline:      2-2022:        Baseline:          Past Medical History:   Diagnosis Date    Acne     Chicken pox        No past surgical history on file.    Outpatient Medications Marked as Taking for the 7/15/24 encounter (Procedure visit) with Kate Lyons MD   Medication Sig Dispense Refill    spironolactone (ALDACTONE) 50 MG tablet Take 1 tablet by mouth daily 90 tablet 3    tretinoin (RETIN-A) 0.1 % cream Apply a pea sized amount to the face at bedtime. 20 g 4    clobetasol (TEMOVATE) 0.05 % ointment Apply to affected area BID PRN flares 30 g 0    Acetaminophen-Codeine (TYLENOL WITH CODEINE #3 PO) Take by mouth as needed (Migraines)      Drospirenone-Ethinyl Estradiol (JEREMY 28 PO) Take by mouth      cetirizine (ZYRTEC) 10 MG tablet Take 1 tablet by mouth daily      Cholecalciferol (VITAMIN D3) 2000 UNITS CAPS Take 1 capsule by mouth daily. 30 capsule 11       Allergies   Allergen Reactions    Ciprofloxacin      Lip swelling    Neosporin [Bacitracin-Polymyxin B]     Seasonal Other (See Comments)     sneezing    Bactroban [Mupirocin Calcium] Rash         We discussed treatment options, including no treatment as well as the following:  - need for multiple treatments and risk of incomplete

## 2024-07-15 NOTE — PATIENT INSTRUCTIONS
Following the procedure, the treated areas may be red or appear bruised and will likely be swollen for a few hours or up to a few days.  The affected areas should be treated delicately following treatment.  Discomfort and swelling should be treated with the application of hourly cool compresses the evening of the procedure.  Avoid applying ice directly to the skin.  If your face was treated, you may want to sleep with your head elevated on an extra pillow to help minimize swelling.    Wear sunscreen daily and avoid strenuous activity following rosacea treatments to optimize results.    Avoid extra aspirin, ibuprofen, vitamin E following the procedure - this may increase your chance of bruising.      Improper care of the treated area while the discoloration is present may increase the chance of scarring or skin textural changes to the treated area.    Please call the office if you have excessive discomfort, herpes simplex virus flare, or burns, blisters, or scabbing.    200

## 2024-09-23 ENCOUNTER — OFFICE VISIT (OUTPATIENT)
Dept: DERMATOLOGY | Age: 39
End: 2024-09-23
Payer: COMMERCIAL

## 2024-09-23 DIAGNOSIS — I78.1 TELANGIECTASIA: ICD-10-CM

## 2024-09-23 DIAGNOSIS — L70.9 ADULT ACNE: Primary | ICD-10-CM

## 2024-09-23 DIAGNOSIS — D22.9 MULTIPLE NEVI: ICD-10-CM

## 2024-09-23 DIAGNOSIS — L82.1 SK (SEBORRHEIC KERATOSIS): ICD-10-CM

## 2024-09-23 PROCEDURE — 99213 OFFICE O/P EST LOW 20 MIN: CPT | Performed by: DERMATOLOGY

## 2025-03-11 RX ORDER — SPIRONOLACTONE 50 MG/1
50 TABLET, FILM COATED ORAL DAILY
Qty: 90 TABLET | Refills: 3 | Status: SHIPPED | OUTPATIENT
Start: 2025-03-11